# Patient Record
Sex: FEMALE | Race: BLACK OR AFRICAN AMERICAN | NOT HISPANIC OR LATINO | Employment: STUDENT | URBAN - NONMETROPOLITAN AREA
[De-identification: names, ages, dates, MRNs, and addresses within clinical notes are randomized per-mention and may not be internally consistent; named-entity substitution may affect disease eponyms.]

---

## 2021-08-16 ENCOUNTER — CLINICAL SUPPORT (OUTPATIENT)
Dept: FAMILY MEDICINE | Facility: CLINIC | Age: 21
End: 2021-08-16

## 2021-08-16 DIAGNOSIS — Z11.1 SCREENING-PULMONARY TB: Primary | ICD-10-CM

## 2021-08-16 PROCEDURE — 86580 TB INTRADERMAL TEST: CPT | Mod: ,,, | Performed by: FAMILY MEDICINE

## 2021-08-16 PROCEDURE — 86580 POCT TB SKIN TEST: ICD-10-PCS | Mod: ,,, | Performed by: FAMILY MEDICINE

## 2021-08-16 PROCEDURE — 95115 PR IMMUNOTHERAPY, ONE INJECTION: ICD-10-PCS | Mod: ,,, | Performed by: FAMILY MEDICINE

## 2021-08-16 PROCEDURE — 95115 IMMUNOTHERAPY ONE INJECTION: CPT | Mod: ,,, | Performed by: FAMILY MEDICINE

## 2021-08-18 LAB
TB INDURATION - 48 HR READ: 0 MM
TB INDURATION - 72 HR READ: 0 MM
TB SKIN TEST - 48 HR READ: NEGATIVE
TB SKIN TEST - 72 HR READ: NEGATIVE

## 2021-10-29 ENCOUNTER — OFFICE VISIT (OUTPATIENT)
Dept: FAMILY MEDICINE | Facility: CLINIC | Age: 21
End: 2021-10-29
Payer: COMMERCIAL

## 2021-10-29 VITALS
OXYGEN SATURATION: 100 % | DIASTOLIC BLOOD PRESSURE: 72 MMHG | RESPIRATION RATE: 18 BRPM | SYSTOLIC BLOOD PRESSURE: 128 MMHG | HEART RATE: 86 BPM | TEMPERATURE: 98 F

## 2021-10-29 DIAGNOSIS — R30.0 DYSURIA: Primary | ICD-10-CM

## 2021-10-29 LAB
BILIRUB SERPL-MCNC: NORMAL MG/DL
BLOOD URINE, POC: NORMAL
COLOR, POC UA: YELLOW
GLUCOSE UR QL STRIP: NORMAL
KETONES UR QL STRIP: NORMAL
LEUKOCYTE ESTERASE URINE, POC: NORMAL
NITRITE, POC UA: NORMAL
PH, POC UA: 5.5
PROTEIN, POC: NORMAL
SPECIFIC GRAVITY, POC UA: >=1.03
UROBILINOGEN, POC UA: 0.2

## 2021-10-29 PROCEDURE — 99203 OFFICE O/P NEW LOW 30 MIN: CPT | Mod: ,,, | Performed by: NURSE PRACTITIONER

## 2021-10-29 PROCEDURE — 81003 POCT URINALYSIS W/O SCOPE: ICD-10-PCS | Mod: QW,,, | Performed by: NURSE PRACTITIONER

## 2021-10-29 PROCEDURE — 99203 PR OFFICE/OUTPT VISIT, NEW, LEVL III, 30-44 MIN: ICD-10-PCS | Mod: ,,, | Performed by: NURSE PRACTITIONER

## 2021-10-29 PROCEDURE — 81003 URINALYSIS AUTO W/O SCOPE: CPT | Mod: QW,,, | Performed by: NURSE PRACTITIONER

## 2021-10-29 RX ORDER — NITROFURANTOIN 25; 75 MG/1; MG/1
100 CAPSULE ORAL 2 TIMES DAILY
Qty: 14 CAPSULE | Refills: 0 | Status: SHIPPED | OUTPATIENT
Start: 2021-10-29 | End: 2022-09-07 | Stop reason: CLARIF

## 2021-10-29 RX ORDER — PHENAZOPYRIDINE HYDROCHLORIDE 100 MG/1
100 TABLET, FILM COATED ORAL 3 TIMES DAILY PRN
Qty: 15 TABLET | Refills: 0 | Status: SHIPPED | OUTPATIENT
Start: 2021-10-29 | End: 2021-11-08

## 2022-04-28 ENCOUNTER — OFFICE VISIT (OUTPATIENT)
Dept: FAMILY MEDICINE | Facility: CLINIC | Age: 22
End: 2022-04-28
Payer: COMMERCIAL

## 2022-04-28 VITALS
BODY MASS INDEX: 29.44 KG/M2 | HEART RATE: 111 BPM | RESPIRATION RATE: 18 BRPM | HEIGHT: 62 IN | WEIGHT: 160 LBS | SYSTOLIC BLOOD PRESSURE: 138 MMHG | DIASTOLIC BLOOD PRESSURE: 82 MMHG | OXYGEN SATURATION: 100 % | TEMPERATURE: 99 F

## 2022-04-28 DIAGNOSIS — J02.9 ACUTE PHARYNGITIS, UNSPECIFIED ETIOLOGY: Primary | ICD-10-CM

## 2022-04-28 PROCEDURE — 96372 THER/PROPH/DIAG INJ SC/IM: CPT | Mod: ,,, | Performed by: NURSE PRACTITIONER

## 2022-04-28 PROCEDURE — 3008F PR BODY MASS INDEX (BMI) DOCUMENTED: ICD-10-PCS | Mod: CPTII,,, | Performed by: NURSE PRACTITIONER

## 2022-04-28 PROCEDURE — 3079F DIAST BP 80-89 MM HG: CPT | Mod: CPTII,,, | Performed by: NURSE PRACTITIONER

## 2022-04-28 PROCEDURE — 96372 PR INJECTION,THERAP/PROPH/DIAG2ST, IM OR SUBCUT: ICD-10-PCS | Mod: ,,, | Performed by: NURSE PRACTITIONER

## 2022-04-28 PROCEDURE — 3075F SYST BP GE 130 - 139MM HG: CPT | Mod: CPTII,,, | Performed by: NURSE PRACTITIONER

## 2022-04-28 PROCEDURE — 3079F PR MOST RECENT DIASTOLIC BLOOD PRESSURE 80-89 MM HG: ICD-10-PCS | Mod: CPTII,,, | Performed by: NURSE PRACTITIONER

## 2022-04-28 PROCEDURE — 99213 OFFICE O/P EST LOW 20 MIN: CPT | Mod: 25,,, | Performed by: NURSE PRACTITIONER

## 2022-04-28 PROCEDURE — 3075F PR MOST RECENT SYSTOLIC BLOOD PRESS GE 130-139MM HG: ICD-10-PCS | Mod: CPTII,,, | Performed by: NURSE PRACTITIONER

## 2022-04-28 PROCEDURE — 1159F MED LIST DOCD IN RCRD: CPT | Mod: CPTII,,, | Performed by: NURSE PRACTITIONER

## 2022-04-28 PROCEDURE — 1159F PR MEDICATION LIST DOCUMENTED IN MEDICAL RECORD: ICD-10-PCS | Mod: CPTII,,, | Performed by: NURSE PRACTITIONER

## 2022-04-28 PROCEDURE — 3008F BODY MASS INDEX DOCD: CPT | Mod: CPTII,,, | Performed by: NURSE PRACTITIONER

## 2022-04-28 PROCEDURE — 99213 PR OFFICE/OUTPT VISIT, EST, LEVL III, 20-29 MIN: ICD-10-PCS | Mod: 25,,, | Performed by: NURSE PRACTITIONER

## 2022-04-28 RX ORDER — AMOXICILLIN 500 MG/1
500 CAPSULE ORAL EVERY 12 HOURS
Qty: 20 CAPSULE | Refills: 0 | Status: SHIPPED | OUTPATIENT
Start: 2022-04-28 | End: 2022-05-08

## 2022-04-28 RX ORDER — CEFTRIAXONE 1 G/1
1 INJECTION, POWDER, FOR SOLUTION INTRAMUSCULAR; INTRAVENOUS
Status: COMPLETED | OUTPATIENT
Start: 2022-04-28 | End: 2022-04-28

## 2022-04-28 RX ADMIN — CEFTRIAXONE 1 G: 1 INJECTION, POWDER, FOR SOLUTION INTRAMUSCULAR; INTRAVENOUS at 02:04

## 2022-04-28 NOTE — PROGRESS NOTES
Subjective:       Patient ID: Teetee Webb is a 22 y.o. female.    Chief Complaint: Sore Throat (Sore throat since yesterday)    Sore throat x 2 days    Review of Systems   Constitutional: Negative for appetite change, fatigue and fever.   HENT: Positive for sore throat. Negative for nasal congestion and ear pain.    Eyes: Negative for pain, discharge and itching.   Respiratory: Negative for cough and shortness of breath.    Cardiovascular: Negative for chest pain and leg swelling.   Gastrointestinal: Negative for abdominal pain, change in bowel habit, nausea, vomiting and change in bowel habit.   Musculoskeletal: Negative for back pain, gait problem and neck pain.   Integumentary:  Negative for rash and wound.   Allergic/Immunologic: Negative for immunocompromised state.   Neurological: Negative for dizziness, weakness and headaches.   All other systems reviewed and are negative.        Objective:      Physical Exam  Vitals and nursing note reviewed.   Constitutional:       General: She is not in acute distress.     Appearance: Normal appearance. She is not ill-appearing, toxic-appearing or diaphoretic.   HENT:      Head: Normocephalic.      Right Ear: Tympanic membrane, ear canal and external ear normal.      Left Ear: Tympanic membrane, ear canal and external ear normal.      Nose: Congestion present. No rhinorrhea.      Mouth/Throat:      Mouth: Mucous membranes are moist.      Pharynx: Posterior oropharyngeal erythema present. No oropharyngeal exudate.   Eyes:      General: No scleral icterus.        Right eye: No discharge.         Left eye: No discharge.      Extraocular Movements: Extraocular movements intact.      Conjunctiva/sclera: Conjunctivae normal.      Pupils: Pupils are equal, round, and reactive to light.   Cardiovascular:      Rate and Rhythm: Normal rate and regular rhythm.      Pulses: Normal pulses.      Heart sounds: Normal heart sounds. No murmur heard.  Pulmonary:      Effort: Pulmonary  effort is normal. No respiratory distress.      Breath sounds: Normal breath sounds. No wheezing, rhonchi or rales.   Musculoskeletal:         General: Normal range of motion.      Cervical back: Neck supple. No tenderness.   Lymphadenopathy:      Cervical: No cervical adenopathy.   Skin:     General: Skin is warm and dry.      Capillary Refill: Capillary refill takes less than 2 seconds.      Findings: No rash.   Neurological:      Mental Status: She is alert and oriented to person, place, and time.   Psychiatric:         Mood and Affect: Mood normal.         Behavior: Behavior normal.         Thought Content: Thought content normal.         Judgment: Judgment normal.            Assessment:       1. Acute pharyngitis, unspecified etiology        Plan:   Acute pharyngitis, unspecified etiology  -     cefTRIAXone injection 1 g  -     amoxicillin (AMOXIL) 500 MG capsule; Take 1 capsule (500 mg total) by mouth every 12 (twelve) hours. for 10 days  Dispense: 20 capsule; Refill: 0

## 2022-04-28 NOTE — LETTER
April 28, 2022      [facility]  [address] [facility] [place] [address]  Phone: [phone]  Fax: [phone]       Patient: Teetee Webb   YOB: 2000  Date of Visit: 04/28/2022    To Whom It May Concern:    Chandra Webb  was at [facility] on 04/28/2022. The patient may return to work/school on 04/29/2022 with no restrictions. If you have any questions or concerns, or if I can be of further assistance, please do not hesitate to contact me.    Sincerely,    Elizabeth Barrientos RN

## 2022-06-20 ENCOUNTER — OFFICE VISIT (OUTPATIENT)
Dept: FAMILY MEDICINE | Facility: CLINIC | Age: 22
End: 2022-06-20
Payer: COMMERCIAL

## 2022-06-20 VITALS
HEART RATE: 112 BPM | WEIGHT: 161 LBS | SYSTOLIC BLOOD PRESSURE: 132 MMHG | BODY MASS INDEX: 28.53 KG/M2 | TEMPERATURE: 98 F | DIASTOLIC BLOOD PRESSURE: 90 MMHG | OXYGEN SATURATION: 99 % | HEIGHT: 63 IN

## 2022-06-20 DIAGNOSIS — Z23 ENCOUNTER FOR VACCINATION: ICD-10-CM

## 2022-06-20 DIAGNOSIS — Z11.1 ENCOUNTER FOR TUBERCULIN SKIN TEST: ICD-10-CM

## 2022-06-20 DIAGNOSIS — Z02.0 ENCOUNTER FOR SCHOOL HEALTH EXAMINATION: Primary | ICD-10-CM

## 2022-06-20 PROCEDURE — 86580 TB INTRADERMAL TEST: CPT | Mod: ,,, | Performed by: NURSE PRACTITIONER

## 2022-06-20 PROCEDURE — 86580 PR  TB INTRADERMAL TEST: ICD-10-PCS | Mod: ,,, | Performed by: NURSE PRACTITIONER

## 2022-06-20 PROCEDURE — 1159F PR MEDICATION LIST DOCUMENTED IN MEDICAL RECORD: ICD-10-PCS | Mod: CPTII,,, | Performed by: NURSE PRACTITIONER

## 2022-06-20 PROCEDURE — 90715 TDAP VACCINE GREATER THAN OR EQUAL TO 7YO IM: ICD-10-PCS | Mod: ,,, | Performed by: NURSE PRACTITIONER

## 2022-06-20 PROCEDURE — 3075F SYST BP GE 130 - 139MM HG: CPT | Mod: CPTII,,, | Performed by: NURSE PRACTITIONER

## 2022-06-20 PROCEDURE — 90471 TDAP VACCINE GREATER THAN OR EQUAL TO 7YO IM: ICD-10-PCS | Mod: ,,, | Performed by: NURSE PRACTITIONER

## 2022-06-20 PROCEDURE — 1160F RVW MEDS BY RX/DR IN RCRD: CPT | Mod: CPTII,,, | Performed by: NURSE PRACTITIONER

## 2022-06-20 PROCEDURE — 90471 IMMUNIZATION ADMIN: CPT | Mod: ,,, | Performed by: NURSE PRACTITIONER

## 2022-06-20 PROCEDURE — 3075F PR MOST RECENT SYSTOLIC BLOOD PRESS GE 130-139MM HG: ICD-10-PCS | Mod: CPTII,,, | Performed by: NURSE PRACTITIONER

## 2022-06-20 PROCEDURE — 1160F PR REVIEW ALL MEDS BY PRESCRIBER/CLIN PHARMACIST DOCUMENTED: ICD-10-PCS | Mod: CPTII,,, | Performed by: NURSE PRACTITIONER

## 2022-06-20 PROCEDURE — 90715 TDAP VACCINE 7 YRS/> IM: CPT | Mod: ,,, | Performed by: NURSE PRACTITIONER

## 2022-06-20 PROCEDURE — 3008F PR BODY MASS INDEX (BMI) DOCUMENTED: ICD-10-PCS | Mod: CPTII,,, | Performed by: NURSE PRACTITIONER

## 2022-06-20 PROCEDURE — 99213 OFFICE O/P EST LOW 20 MIN: CPT | Mod: 25,,, | Performed by: NURSE PRACTITIONER

## 2022-06-20 PROCEDURE — 3080F PR MOST RECENT DIASTOLIC BLOOD PRESSURE >= 90 MM HG: ICD-10-PCS | Mod: CPTII,,, | Performed by: NURSE PRACTITIONER

## 2022-06-20 PROCEDURE — 99213 PR OFFICE/OUTPT VISIT, EST, LEVL III, 20-29 MIN: ICD-10-PCS | Mod: 25,,, | Performed by: NURSE PRACTITIONER

## 2022-06-20 PROCEDURE — 1159F MED LIST DOCD IN RCRD: CPT | Mod: CPTII,,, | Performed by: NURSE PRACTITIONER

## 2022-06-20 PROCEDURE — 3008F BODY MASS INDEX DOCD: CPT | Mod: CPTII,,, | Performed by: NURSE PRACTITIONER

## 2022-06-20 PROCEDURE — 3080F DIAST BP >= 90 MM HG: CPT | Mod: CPTII,,, | Performed by: NURSE PRACTITIONER

## 2022-06-20 NOTE — PROGRESS NOTES
[facility] Medicine    Chief Complaint      Chief Complaint   Patient presents with    Establish Care     Pt to establish care - will need TB skin test and Tdap for school today!       History of Present Illness      Teetee Webb is a 22 y.o. female with no chronic conditions who presents today for PE and TBST and Tdap needed for school.    Past Medical History:  History reviewed. No pertinent past medical history.    Past Surgical History:   has no past surgical history on file.    Social History:  Social History     Tobacco Use    Smoking status: Never Smoker    Smokeless tobacco: Never Used   Substance Use Topics    Alcohol use: Never    Drug use: Never       I personally reviewed all past medical, surgical, and social.     Review of Systems   Constitutional: Negative for fatigue.   HENT: Negative for ear pain and sore throat.    Eyes: Negative for pain, discharge and visual disturbance.   Respiratory: Negative for cough and shortness of breath.    Cardiovascular: Negative.    Gastrointestinal: Negative for abdominal pain, constipation and diarrhea.   Genitourinary: Negative for dysuria, menstrual problem and vaginal discharge.   Musculoskeletal: Negative for gait problem.   Skin: Negative.    Neurological: Negative for dizziness and light-headedness.        Medications:  Outpatient Encounter Medications as of 6/20/2022   Medication Sig Dispense Refill    nitrofurantoin, macrocrystal-monohydrate, (MACROBID) 100 MG capsule Take 1 capsule (100 mg total) by mouth 2 (two) times daily. (Patient not taking: No sig reported) 14 capsule 0     Facility-Administered Encounter Medications as of 6/20/2022   Medication Dose Route Frequency Provider Last Rate Last Admin    [COMPLETED] tuberculin injection 5 Units  5 Units Intradermal 1 time in Clinic/HOD ZAIRA Hauser   5 Units at 06/20/22 1105       Allergies:  Review of patient's allergies indicates:  No Known Allergies    Health Maintenance:  Immunization  "History   Administered Date(s) Administered    COVID-19, MRNA, LN-S, PF (MODERNA FULL 0.5 ML DOSE) 03/16/2021, 04/16/2021    PPD Test 08/16/2021, 06/20/2022    Tdap 06/20/2022      Health Maintenance   Topic Date Due    Hepatitis C Screening  Never done    Lipid Panel  Never done    HPV Vaccines (1 - 2-dose series) Never done    Pap Smear  Never done    TETANUS VACCINE  06/20/2032        Physical Exam      Vital Signs  Temp: 97.9 °F (36.6 °C)  Temp src: Oral  Pulse: (!) 112  SpO2: 99 %  BP: (!) 132/90  BP Location: Left arm  Patient Position: Sitting  Height and Weight  Height: 5' 3" (160 cm)  Weight: 73 kg (161 lb)  BSA (Calculated - sq m): 1.8 sq meters  BMI (Calculated): 28.5  Weight in (lb) to have BMI = 25: 140.8]    Physical Exam  Vitals and nursing note reviewed.   Constitutional:       Appearance: Normal appearance. She is well-developed.   HENT:      Head: Normocephalic.      Right Ear: Hearing normal.      Left Ear: Hearing normal.      Nose: Nose normal.   Eyes:      General: Lids are normal.      Extraocular Movements: Extraocular movements intact.      Conjunctiva/sclera: Conjunctivae normal.      Pupils: Pupils are equal, round, and reactive to light.   Cardiovascular:      Rate and Rhythm: Regular rhythm. Tachycardia present.      Heart sounds: Normal heart sounds.   Pulmonary:      Effort: Pulmonary effort is normal.      Breath sounds: Normal breath sounds.   Musculoskeletal:         General: Normal range of motion.      Cervical back: Normal range of motion and neck supple.      Right lower leg: No edema.      Left lower leg: No edema.   Skin:     General: Skin is warm and dry.   Neurological:      Mental Status: She is alert and oriented to person, place, and time.      Gait: Gait is intact.   Psychiatric:         Behavior: Behavior is cooperative.          Laboratory:  CBC:      CMP:        Invalid input(s): CREATININ  LIPIDS:      TSH:      A1C:        Assessment/Plan     Teetee Webb " is a 22 y.o.female with:     1. Encounter for vaccination  - (In Office Administered) Tdap Vaccine    2. Encounter for tuberculin skin test  - tuberculin injection 5 Units    3. Encounter for school health examination       Total time spent face-to-face and non-face-to-face coordinating care for this encounter was: 20 min    Chronic conditions status updated as per HPI.  Other than changes above, cont current medications and maintain follow up with specialists.  Return to clinic as needed.    Nanette Esparza, LENIP  [facility]

## 2022-06-22 ENCOUNTER — CLINICAL SUPPORT (OUTPATIENT)
Dept: FAMILY MEDICINE | Facility: CLINIC | Age: 22
End: 2022-06-22
Payer: COMMERCIAL

## 2022-06-22 DIAGNOSIS — Z11.1 ENCOUNTER FOR TUBERCULIN SKIN TEST: Primary | ICD-10-CM

## 2022-09-07 ENCOUNTER — HOSPITAL ENCOUNTER (EMERGENCY)
Facility: HOSPITAL | Age: 22
Discharge: HOME | End: 2022-09-07
Attending: EMERGENCY MEDICINE
Payer: COMMERCIAL

## 2022-09-07 VITALS
HEART RATE: 75 BPM | BODY MASS INDEX: 29.15 KG/M2 | SYSTOLIC BLOOD PRESSURE: 152 MMHG | DIASTOLIC BLOOD PRESSURE: 95 MMHG | RESPIRATION RATE: 14 BRPM | HEIGHT: 63 IN | TEMPERATURE: 99 F | OXYGEN SATURATION: 99 % | WEIGHT: 164.5 LBS
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
## 2022-09-07 PROCEDURE — 99281 EMR DPT VST MAYX REQ PHY/QHP: CPT

## 2022-09-07 PROCEDURE — 99282 EMERGENCY DEPT VISIT SF MDM: CPT | Mod: ,,, | Performed by: EMERGENCY MEDICINE

## 2022-09-07 PROCEDURE — 99282 PR EMERGENCY DEPT VISIT,LEVEL II: ICD-10-PCS | Mod: ,,, | Performed by: EMERGENCY MEDICINE
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
## 2022-09-07 NOTE — DISCHARGE INSTRUCTIONS
Follow up with PCP as needed.  If you develop any worsening problems or complaints. Return to emergency Department.

## 2022-09-08 ENCOUNTER — TELEPHONE (OUTPATIENT)
Dept: EMERGENCY MEDICINE | Facility: HOSPITAL | Age: 22
End: 2022-09-08
Payer: COMMERCIAL

## 2022-10-13 ENCOUNTER — OFFICE VISIT (OUTPATIENT)
Dept: FAMILY MEDICINE | Facility: CLINIC | Age: 22
End: 2022-10-13
Payer: COMMERCIAL

## 2022-10-13 DIAGNOSIS — J11.1 INFLUENZA: Primary | ICD-10-CM

## 2022-10-13 DIAGNOSIS — R50.9 FEVER, LOW GRADE: ICD-10-CM

## 2022-10-13 DIAGNOSIS — R52 GENERALIZED BODY ACHES: ICD-10-CM

## 2022-10-13 PROBLEM — J02.9 ACUTE PHARYNGITIS: Status: RESOLVED | Noted: 2022-04-28 | Resolved: 2022-10-13

## 2022-10-13 PROBLEM — R30.0 DYSURIA: Status: RESOLVED | Noted: 2021-10-29 | Resolved: 2022-10-13

## 2022-10-13 LAB
CTP QC/QA: YES
FLUAV AG NPH QL: POSITIVE
FLUBV AG NPH QL: NEGATIVE
SARS-COV-2 AG RESP QL IA.RAPID: NEGATIVE

## 2022-10-13 PROCEDURE — 99213 OFFICE O/P EST LOW 20 MIN: CPT | Mod: ,,, | Performed by: NURSE PRACTITIONER

## 2022-10-13 PROCEDURE — 87428 SARSCOV & INF VIR A&B AG IA: CPT | Mod: QW,,, | Performed by: NURSE PRACTITIONER

## 2022-10-13 PROCEDURE — 87428 POCT SARS-COV2 (COVID) WITH FLU ANTIGEN: ICD-10-PCS | Mod: QW,,, | Performed by: NURSE PRACTITIONER

## 2022-10-13 PROCEDURE — 1160F RVW MEDS BY RX/DR IN RCRD: CPT | Mod: CPTII,,, | Performed by: NURSE PRACTITIONER

## 2022-10-13 PROCEDURE — 1159F PR MEDICATION LIST DOCUMENTED IN MEDICAL RECORD: ICD-10-PCS | Mod: CPTII,,, | Performed by: NURSE PRACTITIONER

## 2022-10-13 PROCEDURE — 99213 PR OFFICE/OUTPT VISIT, EST, LEVL III, 20-29 MIN: ICD-10-PCS | Mod: ,,, | Performed by: NURSE PRACTITIONER

## 2022-10-13 PROCEDURE — 1159F MED LIST DOCD IN RCRD: CPT | Mod: CPTII,,, | Performed by: NURSE PRACTITIONER

## 2022-10-13 PROCEDURE — 1160F PR REVIEW ALL MEDS BY PRESCRIBER/CLIN PHARMACIST DOCUMENTED: ICD-10-PCS | Mod: CPTII,,, | Performed by: NURSE PRACTITIONER

## 2022-10-13 RX ORDER — OSELTAMIVIR PHOSPHATE 75 MG/1
75 CAPSULE ORAL 2 TIMES DAILY
Qty: 10 CAPSULE | Refills: 0 | Status: SHIPPED | OUTPATIENT
Start: 2022-10-13 | End: 2022-10-18

## 2022-10-13 NOTE — LETTER
October 13, 2022      [facility]  [address] [facility] [address]  Phone: [phone]  Fax: [phone]       Patient: Teetee Webb   YOB: 2000  Date of Visit: 10/13/2022    To Whom It May Concern:    Chandra Webb  was at [facility] on 10/13/2022. The patient may return to work/school on 10\18\2022 with no restrictions. If you have any questions or concerns, or if I can be of further assistance, please do not hesitate to contact me.    Sincerely,    LATOSHA Lee

## 2022-10-13 NOTE — PROGRESS NOTES
[facility] Medicine          Chief Complaint        Chief Complaint   Patient presents with    Fever     Current fever is 100.4    Generalized Body Aches     X1 day    Chills     X1 day               History of Present Illness           Teetee Webb is a 22 y.o. female with chronic conditions of none who presents today for body aches, chills, and fever. The s/s started about one day ago.  The pt denies chest pains, sob, n/v, and dizziness.           Past Medical History:     History reviewed. No pertinent past medical history.          Past Surgical History:      has a past surgical history that includes Wayne tooth extraction.          Social History:     Social History     Tobacco Use    Smoking status: Never    Smokeless tobacco: Never   Substance Use Topics    Alcohol use: Never    Drug use: Never             I personally reviewed all past medical, surgical, and social.           Review of Systems   Constitutional:  Positive for chills, fatigue and fever.   HENT:  Positive for congestion.    Eyes: Negative.    Respiratory: Negative.     Cardiovascular: Negative.    Gastrointestinal: Negative.    Endocrine: Negative.    Genitourinary: Negative.    Musculoskeletal:  Positive for myalgias.   Skin: Negative.    Allergic/Immunologic: Negative.    Neurological: Negative.    Psychiatric/Behavioral: Negative.              Medications:     Outpatient Encounter Medications as of 10/13/2022   Medication Sig Dispense Refill    oseltamivir (TAMIFLU) 75 MG capsule Take 1 capsule (75 mg total) by mouth 2 (two) times daily. for 5 days 10 capsule 0     No facility-administered encounter medications on file as of 10/13/2022.             Allergies:     Review of patient's allergies indicates:  No Known Allergies          Health Maintenance:     Immunization History   Administered Date(s) Administered    COVID-19, MRNA, LN-S, PF (MODERNA FULL 0.5 ML DOSE) 03/16/2021, 04/16/2021    PPD Test 08/16/2021, 06/20/2022    Tdap  06/20/2022         Health Maintenance   Topic Date Due    Hepatitis C Screening  Never done    Lipid Panel  Never done    HPV Vaccines (1 - 2-dose series) Never done    Pap Smear  Never done    TETANUS VACCINE  06/20/2032              Physical Exam            ]          Physical Exam  Constitutional:       Appearance: Normal appearance.   HENT:      Head: Normocephalic.      Right Ear: External ear normal.      Left Ear: External ear normal.      Nose: Nose normal.      Mouth/Throat:      Pharynx: Oropharynx is clear.   Eyes:      Conjunctiva/sclera: Conjunctivae normal.   Cardiovascular:      Rate and Rhythm: Normal rate.   Pulmonary:      Effort: Pulmonary effort is normal.   Abdominal:      General: Abdomen is flat.      Palpations: Abdomen is soft.   Musculoskeletal:         General: No swelling or tenderness. Normal range of motion.      Cervical back: Neck supple.      Right lower leg: No edema.      Left lower leg: No edema.   Skin:     General: Skin is warm.      Coloration: Skin is not jaundiced or pale.      Findings: No bruising, erythema or rash.   Neurological:      General: No focal deficit present.      Mental Status: She is alert and oriented to person, place, and time. Mental status is at baseline.      Cranial Nerves: No cranial nerve deficit.      Sensory: No sensory deficit.      Motor: No weakness.      Gait: Gait normal.   Psychiatric:         Mood and Affect: Mood normal.         Behavior: Behavior normal.         Thought Content: Thought content normal.         Judgment: Judgment normal.              Laboratory:     CBC:            CMP:           Invalid input(s): CREATININ     LIPIDS:            TSH:            A1C:                 Assessment/Plan          Teetee Webb is a 22 y.o.female with:           1. Influenza  - oseltamivir (TAMIFLU) 75 MG capsule; Take 1 capsule (75 mg total) by mouth 2 (two) times daily. for 5 days  Dispense: 10 capsule; Refill: 0    2. Fever, low grade  - POCT  SARS-COV2 (COVID) with Flu Antigen    3. Generalized body aches             Total time spent face-to-face and non-face-to-face coordinating care for this encounter was: 25 min          Chronic conditions status updated as per HPI.  Other than changes above, cont current medications and maintain follow up with specialists.  Return to clinic PRN.          LATOSHA Lee     [facility]

## 2023-03-08 ENCOUNTER — OFFICE VISIT (OUTPATIENT)
Dept: OBSTETRICS AND GYNECOLOGY | Facility: CLINIC | Age: 23
End: 2023-03-08
Payer: COMMERCIAL

## 2023-03-08 VITALS
RESPIRATION RATE: 17 BRPM | BODY MASS INDEX: 30.12 KG/M2 | HEART RATE: 97 BPM | SYSTOLIC BLOOD PRESSURE: 142 MMHG | WEIGHT: 170 LBS | OXYGEN SATURATION: 100 % | DIASTOLIC BLOOD PRESSURE: 80 MMHG | HEIGHT: 63 IN | TEMPERATURE: 99 F

## 2023-03-08 DIAGNOSIS — Z01.419 ENCOUNTER FOR WELL WOMAN EXAM WITH ROUTINE GYNECOLOGICAL EXAM: Primary | ICD-10-CM

## 2023-03-08 PROCEDURE — 1159F PR MEDICATION LIST DOCUMENTED IN MEDICAL RECORD: ICD-10-PCS | Mod: ,,, | Performed by: STUDENT IN AN ORGANIZED HEALTH CARE EDUCATION/TRAINING PROGRAM

## 2023-03-08 PROCEDURE — 99214 OFFICE O/P EST MOD 30 MIN: CPT | Mod: PBBFAC | Performed by: STUDENT IN AN ORGANIZED HEALTH CARE EDUCATION/TRAINING PROGRAM

## 2023-03-08 PROCEDURE — 1159F MED LIST DOCD IN RCRD: CPT | Mod: ,,, | Performed by: STUDENT IN AN ORGANIZED HEALTH CARE EDUCATION/TRAINING PROGRAM

## 2023-03-08 PROCEDURE — 3077F PR MOST RECENT SYSTOLIC BLOOD PRESSURE >= 140 MM HG: ICD-10-PCS | Mod: ,,, | Performed by: STUDENT IN AN ORGANIZED HEALTH CARE EDUCATION/TRAINING PROGRAM

## 2023-03-08 PROCEDURE — 99385 PR PREVENTIVE VISIT,NEW,18-39: ICD-10-PCS | Mod: S$PBB,,, | Performed by: STUDENT IN AN ORGANIZED HEALTH CARE EDUCATION/TRAINING PROGRAM

## 2023-03-08 PROCEDURE — 99385 PREV VISIT NEW AGE 18-39: CPT | Mod: S$PBB,,, | Performed by: STUDENT IN AN ORGANIZED HEALTH CARE EDUCATION/TRAINING PROGRAM

## 2023-03-08 PROCEDURE — 3008F PR BODY MASS INDEX (BMI) DOCUMENTED: ICD-10-PCS | Mod: ,,, | Performed by: STUDENT IN AN ORGANIZED HEALTH CARE EDUCATION/TRAINING PROGRAM

## 2023-03-08 PROCEDURE — 3077F SYST BP >= 140 MM HG: CPT | Mod: ,,, | Performed by: STUDENT IN AN ORGANIZED HEALTH CARE EDUCATION/TRAINING PROGRAM

## 2023-03-08 PROCEDURE — 3079F PR MOST RECENT DIASTOLIC BLOOD PRESSURE 80-89 MM HG: ICD-10-PCS | Mod: ,,, | Performed by: STUDENT IN AN ORGANIZED HEALTH CARE EDUCATION/TRAINING PROGRAM

## 2023-03-08 PROCEDURE — 3008F BODY MASS INDEX DOCD: CPT | Mod: ,,, | Performed by: STUDENT IN AN ORGANIZED HEALTH CARE EDUCATION/TRAINING PROGRAM

## 2023-03-08 PROCEDURE — 3079F DIAST BP 80-89 MM HG: CPT | Mod: ,,, | Performed by: STUDENT IN AN ORGANIZED HEALTH CARE EDUCATION/TRAINING PROGRAM

## 2023-03-08 RX ORDER — LEVONORGESTREL 13.5 MG/1
1 INTRAUTERINE DEVICE INTRAUTERINE ONCE
COMMUNITY
End: 2024-01-25

## 2023-03-08 NOTE — PROGRESS NOTES
Gynecology History and Physical    Assessment/Plan:   Problem List Items Addressed This Visit    None  Visit Diagnoses       Encounter for well woman exam with routine gynecological exam    -  Primary              CC:   Chief Complaint   Patient presents with    Well Woman     Last pap        HPI: Teetee Webb is a 23 y.o. female who presents to get established.  States she gets annual pap smears because she has had abnormal paps in the past.      Review of Systems: The following ROS was otherwise negative, except as noted in the HPI:  constitutional, HEENT, respiratory, cardiovascular, gastrointestinal, genitourinary, skin, musculoskeletal, neurological, psych    Gynecologic History:   History of abnormal pap smears    Menstrual history: regular with Andrae IUD.        Obstetrical History:  OB History          1    Para        Term                AB   1    Living             SAB        IAB   1    Ectopic        Multiple        Live Births                     Past Medical History:   Past Medical History:   Diagnosis Date    Abnormal Pap smear of cervix        Medications:  Medication List with Changes/Refills   Current Medications    LEVONORGESTREL (ANDRAE) 14 MCG/24 HRS (3 YRS) 13.5 MG IUD    1 each by Intrauterine route once.       Allergies:  Patient has no known allergies.    Surgical History:  Past Surgical History:   Procedure Laterality Date    WISDOM TOOTH EXTRACTION         Family History:  Family History   Problem Relation Age of Onset    Stroke Paternal Grandmother     Hypertension Paternal Grandmother     Diabetes Paternal Grandmother     Hypertension Maternal Grandmother     Breast cancer Maternal Grandmother        Social History:  Social History     Substance and Sexual Activity   Alcohol Use Yes    Comment: socially     Social History     Substance and Sexual Activity   Drug Use Never     Social History     Tobacco Use   Smoking Status Never   Smokeless Tobacco Never  "      Physical Exam:  BP (!) 142/80 (BP Location: Left arm, Patient Position: Sitting)   Pulse 97   Temp 98.8 °F (37.1 °C) (Oral)   Resp 17   Ht 5' 3" (1.6 m)   Wt 77.1 kg (170 lb)   LMP 03/06/2023   SpO2 100%   BMI 30.11 kg/m²     General: Alert, well appearing, no acute distress  Head: Normocephalic, atraumatic  Lungs: Unlabored respirations  Abdomen: Soft, nontender, nondistended   Extremities: No redness or tenderness  Skin: Well perfused, normal coloration and turgor, no lesions or rashes visualized  Neuro: Alert, oriented, normal speech, no focal deficits, moves extremities appropriately  Osteopathic: No TART changes    Labs:  No visits with results within 1 Day(s) from this visit.   Latest known visit with results is:   Office Visit on 10/13/2022   Component Date Value    SARS Coronavirus 2 Antig* 10/13/2022 Negative     Rapid Influenza A Ag 10/13/2022 Positive (A)     Rapid Influenza B Ag 10/13/2022 Negative      Acceptab* 10/13/2022 Yes       Plan:    Follow-up in June for Beatriz removal, possible reinsertion and pap.  "

## 2023-06-20 ENCOUNTER — TELEPHONE (OUTPATIENT)
Dept: OBSTETRICS AND GYNECOLOGY | Facility: CLINIC | Age: 23
End: 2023-06-20
Payer: COMMERCIAL

## 2023-06-20 DIAGNOSIS — Z30.430 ENCOUNTER FOR IUD INSERTION: Primary | ICD-10-CM

## 2023-07-05 ENCOUNTER — PROCEDURE VISIT (OUTPATIENT)
Dept: OBSTETRICS AND GYNECOLOGY | Facility: CLINIC | Age: 23
End: 2023-07-05
Payer: COMMERCIAL

## 2023-07-05 VITALS
DIASTOLIC BLOOD PRESSURE: 85 MMHG | TEMPERATURE: 99 F | SYSTOLIC BLOOD PRESSURE: 130 MMHG | HEART RATE: 91 BPM | HEIGHT: 63 IN | WEIGHT: 160.19 LBS | RESPIRATION RATE: 18 BRPM | BODY MASS INDEX: 28.38 KG/M2

## 2023-07-05 DIAGNOSIS — Z30.430 ENCOUNTER FOR IUD INSERTION: Primary | ICD-10-CM

## 2023-07-05 LAB
B-HCG UR QL: NEGATIVE
CTP QC/QA: YES

## 2023-07-05 PROCEDURE — 58300 INSERTION OF IUD-TODAY: ICD-10-PCS | Mod: S$PBB,,, | Performed by: OBSTETRICS & GYNECOLOGY

## 2023-07-05 PROCEDURE — 58301 REMOVE INTRAUTERINE DEVICE: CPT | Mod: PBBFAC | Performed by: OBSTETRICS & GYNECOLOGY

## 2023-07-05 PROCEDURE — 58300 INSERT INTRAUTERINE DEVICE: CPT | Mod: PBBFAC | Performed by: OBSTETRICS & GYNECOLOGY

## 2023-07-05 PROCEDURE — 58301 REMOVAL OF INTRAUTERINE DEVICE-TODAY: ICD-10-PCS | Mod: S$PBB,,, | Performed by: OBSTETRICS & GYNECOLOGY

## 2023-07-05 PROCEDURE — 81025 URINE PREGNANCY TEST: CPT | Mod: PBBFAC | Performed by: OBSTETRICS & GYNECOLOGY

## 2023-07-05 RX ADMIN — LEVONORGESTREL 14 MCG: 13.5 INTRAUTERINE DEVICE INTRAUTERINE at 02:07

## 2023-07-05 NOTE — PROCEDURES
Removal of Intrauterine Device-Today    Date/Time: 7/5/2023 2:30 PM  Performed by: Adrian Hidalgo MD  Authorized by: Adrian Hidalgo MD

## 2023-07-06 NOTE — PROCEDURES
Insertion of IUD-Today    Date/Time: 2023 2:30 PM  Performed by: Adrian Hidalgo MD  Authorized by: Adrian Hidalgo MD     Consent:     Consent obtained:  Verbal and written    Consent given by:  Patient    Procedure risks and benefits discussed: yes      Patient questions answered: yes      Patient agrees, verbalizes understanding, and wants to proceed: yes    Procedure:     Pelvic exam performed: yes      Negative urine pregnancy test: yes      Cervix cleaned and prepped: yes      Speculum placed in vagina: yes      Tenaculum applied to cervix: no (Allis clamp applied to cervix.)      Uterus sounded: yes      Uterus sound depth (cm):  7    IUD inserted with no complications: yes      Strings trimmed: yes    14 mcg levonorgestreL 14 mcg/24 hrs (3 yrs) 13.5 mg     Post-procedure:     Patient tolerated procedure well: yes      Patient will follow up after next period: yes    Comments:      Uterus anteverted.   Patient tolerated well.  IUD removed. New IUD replaced.     Removal of IUD    Date/Time: 2023 2:30 PM  Performed by: Adrian Hidalgo MD  Authorized by: Adrian Hidalgo MD     Consent obtained:  Verbal and written  Consent given by:  Patient  Procedure risks and benefits discussed: yes    Patient questions answered: yes    Patient agrees, verbalizes understanding, and wants to proceed: yes    Implant grasped by: ring forceps  Other reason for removal:   IUD.  Removed with no complications: yes

## 2023-07-06 NOTE — PATIENT INSTRUCTIONS
Call/return if Temp > 100.4, foul smelling vaginal discharge, vaginal bleeding > 1 pad per hour x 2 consecutive hours, new onset severe pain, or any other acute changes.     May take Motrin or Aleve prn cramping.     Pelvic rest x 24 hours recommended.

## 2023-10-30 ENCOUNTER — OFFICE VISIT (OUTPATIENT)
Dept: DERMATOLOGY | Facility: CLINIC | Age: 23
End: 2023-10-30
Payer: COMMERCIAL

## 2023-10-30 VITALS — HEIGHT: 63 IN | BODY MASS INDEX: 28.39 KG/M2 | WEIGHT: 160.25 LBS

## 2023-10-30 DIAGNOSIS — L70.0 ACNE VULGARIS: Primary | ICD-10-CM

## 2023-10-30 PROCEDURE — 3008F PR BODY MASS INDEX (BMI) DOCUMENTED: ICD-10-PCS | Mod: ,,, | Performed by: DERMATOLOGY

## 2023-10-30 PROCEDURE — 1159F MED LIST DOCD IN RCRD: CPT | Mod: ,,, | Performed by: DERMATOLOGY

## 2023-10-30 PROCEDURE — 1160F RVW MEDS BY RX/DR IN RCRD: CPT | Mod: ,,, | Performed by: DERMATOLOGY

## 2023-10-30 PROCEDURE — 99204 OFFICE O/P NEW MOD 45 MIN: CPT | Mod: ,,, | Performed by: DERMATOLOGY

## 2023-10-30 PROCEDURE — 99204 PR OFFICE/OUTPT VISIT, NEW, LEVL IV, 45-59 MIN: ICD-10-PCS | Mod: ,,, | Performed by: DERMATOLOGY

## 2023-10-30 PROCEDURE — 1159F PR MEDICATION LIST DOCUMENTED IN MEDICAL RECORD: ICD-10-PCS | Mod: ,,, | Performed by: DERMATOLOGY

## 2023-10-30 PROCEDURE — 3008F BODY MASS INDEX DOCD: CPT | Mod: ,,, | Performed by: DERMATOLOGY

## 2023-10-30 PROCEDURE — 1160F PR REVIEW ALL MEDS BY PRESCRIBER/CLIN PHARMACIST DOCUMENTED: ICD-10-PCS | Mod: ,,, | Performed by: DERMATOLOGY

## 2023-10-30 RX ORDER — DOXYCYCLINE 100 MG/1
100 CAPSULE ORAL EVERY 12 HOURS
Qty: 60 CAPSULE | Refills: 2 | Status: SHIPPED | OUTPATIENT
Start: 2023-10-30 | End: 2024-01-25

## 2023-10-30 RX ORDER — CLINDAMYCIN PHOSPHATE 10 UG/ML
LOTION TOPICAL
Qty: 60 ML | Refills: 2 | Status: SHIPPED | OUTPATIENT
Start: 2023-10-30 | End: 2024-01-25

## 2023-10-30 RX ORDER — TRETINOIN 1 MG/G
CREAM TOPICAL
Qty: 20 G | Refills: 3 | Status: SHIPPED | OUTPATIENT
Start: 2023-10-30 | End: 2024-01-25

## 2023-10-30 RX ORDER — BENZOYL PEROXIDE 5 G/100G
GEL TOPICAL
Qty: 60 G | Refills: 3 | Status: SHIPPED | OUTPATIENT
Start: 2023-10-30 | End: 2024-01-25

## 2023-10-30 NOTE — PROGRESS NOTES
[facility]   Rohini Andrade MD    Patient Name: Teetee Webb  Patient YOB: 2000   Date of Service: 10/30/23    CC: Acne    HPI: Teetee Webb is a 23 y.o. female here today for acne, located on the face, shoulders, and back.  Acne has been present for many years.  Previous treatments include Tretinoin 0.1%, clindamycin lotion, and doxycyline.      Past Medical History:   Diagnosis Date    Abnormal Pap smear of cervix      Past Surgical History:   Procedure Laterality Date    WISDOM TOOTH EXTRACTION       Review of patient's allergies indicates:  No Known Allergies    Current Outpatient Medications:     benzoyl peroxide 5% 5 % gel, Patient to mix together benzoyl peroxide gel and clindamycin lotion and apply to face in the AM, Disp: 60 g, Rfl: 3    clindamycin (CLEOCIN T) 1 % lotion, Patient to mix together benzoyl peroxide gel and clindamycin lotion and apply to face in the AM, Disp: 60 mL, Rfl: 2    doxycycline (VIBRAMYCIN) 100 MG Cap, Take 1 capsule (100 mg total) by mouth every 12 (twelve) hours., Disp: 60 capsule, Rfl: 2    levonorgestreL (ANDRAE) 14 mcg/24 hrs (3 yrs) 13.5 mg IUD, 1 each by Intrauterine route once., Disp: , Rfl:     tretinoin (RETIN-A) 0.1 % cream, Apply pea-sized amount to face very other night advancing to nightly when tolerated, Disp: 20 g, Rfl: 3    Current Facility-Administered Medications:     levonorgestreL (ANDRAE) 14 mcg/24 hrs (3 yrs) 13.5 mg IUD 14 mcg, 14 mcg, Intrauterine, , Adrian Hidalgo MD, 14 mcg at 07/05/23 1430    ROS: A focused review of systems was obtained and negative.     Exam: A focused skin exam was performed. All areas examined were normal except as mentioned in the assessment and plan below.  General Appearance of the patient is well developed and well nourished.  Orientation: alert and oriented x 3.  Mood and affect: pleasant.    Assessment:   The encounter diagnosis was Acne vulgaris.    Plan:   Medications Ordered This Encounter    Medications    benzoyl peroxide 5% 5 % gel     Sig: Patient to mix together benzoyl peroxide gel and clindamycin lotion and apply to face in the AM     Dispense:  60 g     Refill:  3    clindamycin (CLEOCIN T) 1 % lotion     Sig: Patient to mix together benzoyl peroxide gel and clindamycin lotion and apply to face in the AM     Dispense:  60 mL     Refill:  2    doxycycline (VIBRAMYCIN) 100 MG Cap     Sig: Take 1 capsule (100 mg total) by mouth every 12 (twelve) hours.     Dispense:  60 capsule     Refill:  2    tretinoin (RETIN-A) 0.1 % cream     Sig: Apply pea-sized amount to face very other night advancing to nightly when tolerated     Dispense:  20 g     Refill:  3       Acne Vulgaris (L70.0)  - Cysts, inflammatory papules and pustules, scars, and comedonal papules  Status: Inadequately controlled      Plan: Counseling.  I counseled the regarding the following:  Skin care: I discussed with the patient the importance of using cleansers, moisturizers and cosmetics that are  non-comedogenic.  Expectations: The patient is aware that it may take up to 2-3 months to see a 60-80% improvement of acne.  Contact office if: Acne worsens or fails to improve despite months of treatment; patient develops new scars,  significantly more nodules or cysts.  I recommended the following over the counter treatments: Cetaphil and CeraVe      Doxycycline Counseling: Patient counseled regarding possible photosensitivity and increased risk for sunburn. Patient instructed to avoid sunlight, if possible. When exposed to sunlight, patients should wear protective clothing, sunglasses, and sunscreen. The patient was instructed to call the office immediately if the following severe adverse effects occur: hearing changes, easy bruising/bleeding, severe headache, or vision changes. The patient verbalized understanding of the proper use and possible adverse effects of doxycycline. All of the patient's questions and concerns were addressed.  Patient understands to avoid pregnancy while on therapy due to potential birth defects.    - Will restart tretinoin and benzoyl peroxide, and clindamycin lotion  - Will start doxycycline, but consider Accutane in the future       Follow up in about 2 months (around 12/30/2023) for Acne.    Rohini Andrade MD

## 2024-01-25 ENCOUNTER — HOSPITAL ENCOUNTER (OUTPATIENT)
Dept: RADIOLOGY | Facility: HOSPITAL | Age: 24
Discharge: HOME | End: 2024-01-25
Attending: OBSTETRICS & GYNECOLOGY
Payer: COMMERCIAL

## 2024-01-25 ENCOUNTER — OFFICE VISIT (OUTPATIENT)
Dept: OBSTETRICS AND GYNECOLOGY | Facility: CLINIC | Age: 24
End: 2024-01-25
Payer: COMMERCIAL

## 2024-01-25 VITALS
DIASTOLIC BLOOD PRESSURE: 85 MMHG | SYSTOLIC BLOOD PRESSURE: 130 MMHG | WEIGHT: 153 LBS | HEART RATE: 72 BPM | HEIGHT: 63 IN | BODY MASS INDEX: 27.11 KG/M2 | RESPIRATION RATE: 18 BRPM | OXYGEN SATURATION: 98 %

## 2024-01-25 DIAGNOSIS — N92.0 INTERMENSTRUAL SPOTTING DUE TO INTRAUTERINE DEVICE (IUD): ICD-10-CM

## 2024-01-25 DIAGNOSIS — Z30.431 IUD CHECK UP: ICD-10-CM

## 2024-01-25 DIAGNOSIS — Z12.4 SCREENING FOR MALIGNANT NEOPLASM OF THE CERVIX: Primary | ICD-10-CM

## 2024-01-25 DIAGNOSIS — Z30.011 ORAL CONTRACEPTION INITIAL PRESCRIPTION: ICD-10-CM

## 2024-01-25 DIAGNOSIS — T83.32XA MALPOSITIONED INTRAUTERINE DEVICE (IUD), INITIAL ENCOUNTER: ICD-10-CM

## 2024-01-25 DIAGNOSIS — Z01.411 ENCOUNTER FOR GYNECOLOGICAL EXAMINATION (GENERAL) (ROUTINE) WITH ABNORMAL FINDINGS: ICD-10-CM

## 2024-01-25 DIAGNOSIS — Z97.5 IUD CONTRACEPTION: ICD-10-CM

## 2024-01-25 DIAGNOSIS — Z30.431 IUD CHECK UP: Primary | ICD-10-CM

## 2024-01-25 DIAGNOSIS — Z97.5 INTERMENSTRUAL SPOTTING DUE TO INTRAUTERINE DEVICE (IUD): ICD-10-CM

## 2024-01-25 PROCEDURE — 1159F MED LIST DOCD IN RCRD: CPT | Mod: ,,, | Performed by: OBSTETRICS & GYNECOLOGY

## 2024-01-25 PROCEDURE — 3079F DIAST BP 80-89 MM HG: CPT | Mod: ,,, | Performed by: OBSTETRICS & GYNECOLOGY

## 2024-01-25 PROCEDURE — 99213 OFFICE O/P EST LOW 20 MIN: CPT | Mod: PBBFAC | Performed by: OBSTETRICS & GYNECOLOGY

## 2024-01-25 PROCEDURE — 76856 US EXAM PELVIC COMPLETE: CPT | Mod: 26,,, | Performed by: RADIOLOGY

## 2024-01-25 PROCEDURE — 3075F SYST BP GE 130 - 139MM HG: CPT | Mod: ,,, | Performed by: OBSTETRICS & GYNECOLOGY

## 2024-01-25 PROCEDURE — 88142 CYTOPATH C/V THIN LAYER: CPT | Mod: TC,GCY | Performed by: OBSTETRICS & GYNECOLOGY

## 2024-01-25 PROCEDURE — 3008F BODY MASS INDEX DOCD: CPT | Mod: ,,, | Performed by: OBSTETRICS & GYNECOLOGY

## 2024-01-25 PROCEDURE — 1160F RVW MEDS BY RX/DR IN RCRD: CPT | Mod: ,,, | Performed by: OBSTETRICS & GYNECOLOGY

## 2024-01-25 PROCEDURE — 99395 PREV VISIT EST AGE 18-39: CPT | Mod: S$PBB,,, | Performed by: OBSTETRICS & GYNECOLOGY

## 2024-01-25 PROCEDURE — 76856 US EXAM PELVIC COMPLETE: CPT | Mod: TC

## 2024-01-25 RX ORDER — NORETHINDRONE ACETATE AND ETHINYL ESTRADIOL 1MG-20(21)
1 KIT ORAL DAILY
Qty: 30 TABLET | Refills: 11 | Status: SHIPPED | OUTPATIENT
Start: 2024-01-25 | End: 2025-01-24

## 2024-01-25 NOTE — PROGRESS NOTES
Annual Gynecologic Exam    Assessment/Plan:  23 y.o.  presenting for her annual exam:    Problem List Items Addressed This Visit    None  Visit Diagnoses       Screening for malignant neoplasm of the cervix    -  Primary    Relevant Orders    ThinPrep Pap Test    Encounter for gynecological examination (general) (routine) with abnormal findings        Intermenstrual spotting due to intrauterine device (IUD)        IUD contraception        Malpositioned intrauterine device (IUD), initial encounter        Relevant Orders    Removal of Intrauterine Device-Today    Oral contraception initial prescription        Relevant Medications    norethindrone-ethinyl estradiol (JUNEL FE 1/20) 1 mg-20 mcg (21)/75 mg (7) per tablet          Annual exam with PAP performed.   Declines STI screening.  TVUS ordered to evaluate IUD position to see if this is a cause of her intermenstrual spotting.   If IUD is in the correct location, then will try a month of OCPs to see if it helps control the bleeding.   RTC in 1 year for annual exam and/or prn.    IUD was found to be in the AARON. Discussed replacement vs. Removal and change to another form of contraception. She desires removal of IUD and to start OCPs.     IUD removed with ring forceps. Patient tolerated well.   New Rx for OCPs provided.    Health Maintenance:    Birth control: Beatriz IUD  Pregnancy plans: None   Safe relationship: Yes  PCP: None     Screening:  Last pap smear:   History of abnormal pap smears: Yes      CC:   Chief Complaint   Patient presents with    Annual Exam       HPI:  23 y.o.  presents for her gynecologic annual exam. She is doing well today. She is using the Beatriz IUD for contraception. She is working well for her. She has menses for 5-7 days with normal flow and minimal cramping. However, since her replacement IUD in July, she has noticed that 1 week after menses she gets spotting x 3-4 days. This is interfering with her quality of life.  Denies any pelvic pain or pain with sex.   She does have a h/o abnormal PAPs at her previous OBGYN office and was told to get annual PAPs.   She declines STI screening today.    Review of Systems: The following ROS was otherwise negative, except as noted in the HPI:  constitutional, HEENT, respiratory, cardiovascular, gastrointestinal, genitourinary, skin, musculoskeletal, neurological, psych    Primary Care Physician: No, Primary Doctor    Obstetric History  OB History    Para Term  AB Living   1       1     SAB IAB Ectopic Multiple Live Births     1            # Outcome Date GA Lbr Boaz/2nd Weight Sex Delivery Anes PTL Lv   1 IAB 2017               Gynecologic History       Social History     Substance and Sexual Activity   Sexual Activity Yes    Partners: Male    Birth control/protection: I.U.D.                   Menstrual History:   LMP: 2024    Age of Menarche: 10   Menstrual Period: regular   Interval Between Menses: 1 month   Duration of Menses: 5-7 days    Menstrual Flow: normal    Bleeding between menses: Yes 3-4 days    Sexual History:    Contraception: see above   Currently is sexually active   Denies history of STIs   Denies sexual problems    Pap History:   History of abnormal pap smears: see above   Last pap: see above    Medical History:  Past Medical History:   Diagnosis Date    Abnormal Pap smear of cervix        Medications:  Medication List with Changes/Refills   New Medications    NORETHINDRONE-ETHINYL ESTRADIOL (JUNEL FE 1/20) 1 MG-20 MCG (21)/75 MG (7) PER TABLET    Take 1 tablet by mouth once daily.   Discontinued Medications    BENZOYL PEROXIDE 5% 5 % GEL    Patient to mix together benzoyl peroxide gel and clindamycin lotion and apply to face in the AM    CLINDAMYCIN (CLEOCIN T) 1 % LOTION    Patient to mix together benzoyl peroxide gel and clindamycin lotion and apply to face in the AM    DOXYCYCLINE (VIBRAMYCIN) 100 MG CAP    Take 1 capsule (100 mg total) by mouth every 12  "(twelve) hours.    LEVONORGESTREL (ANDRAE) 14 MCG/24 HRS (3 YRS) 13.5 MG IUD    1 each by Intrauterine route once.    TRETINOIN (RETIN-A) 0.1 % CREAM    Apply pea-sized amount to face very other night advancing to nightly when tolerated       Surgical History:  Past Surgical History:   Procedure Laterality Date    WISDOM TOOTH EXTRACTION         Allergies:  Review of patient's allergies indicates:  No Known Allergies    Family History:  Family History   Problem Relation Age of Onset    Stroke Paternal Grandmother     Hypertension Paternal Grandmother     Diabetes Paternal Grandmother     Hypertension Maternal Grandmother     Breast cancer Maternal Grandmother        Social History:  Social History     Socioeconomic History    Marital status: Single   Tobacco Use    Smoking status: Never    Smokeless tobacco: Never   Substance and Sexual Activity    Alcohol use: Yes     Comment: socially    Drug use: Never    Sexual activity: Yes     Partners: Male     Birth control/protection: I.U.D.       Objective:  Body mass index is 27.1 kg/m².    /85 (BP Location: Right arm, Patient Position: Sitting)   Pulse 72   Resp 18   Ht 5' 3" (1.6 m)   Wt 69.4 kg (153 lb)   SpO2 98%   BMI 27.10 kg/m²     General: Alert, well appearing, no acute distress  Head: Normocephalic, atraumatic  Breasts: Symmetric, non-tender to palpation, no skin changes, palpable axillary lymph nodes or masses noted  Lungs: Unlabored respirations. Clear to auscultation bilaterally.  Cardiovascular: Regular rate and rhythm.  Abdomen: Soft, nontender, nondistended   Pelvic:   External: normal female genitalia, no masses or lesions   Vagina: moist, pink mucosa with rugae, old blood noted small amount.  Cervix: no masses or lesions, nontender. IUD strings visualized. PAP obtained.  Uterus: approx 8 weeks, mobile, midline, nontender  Adnexa: no masses or fullness, nontender  Rectovaginal: deferred  Extremities: No redness or tenderness  Skin: Well " perfused, normal coloration and turgor, no lesions or rashes visualized  Neuro: Alert, oriented, normal speech, no focal deficits, moves extremities appropriately  Psych: Normal mood and behavior.

## 2024-01-29 LAB
GH SERPL-MCNC: NORMAL NG/ML
INSULIN SERPL-ACNC: NORMAL U[IU]/ML
LAB AP CLINICAL INFORMATION: NORMAL
LAB AP GYN INTERPRETATION: NEGATIVE
LAB AP PAP DISCLAIMER COMMENTS: NORMAL
RENIN PLAS-CCNC: NORMAL NG/ML/H

## 2024-01-31 DIAGNOSIS — B96.89 BACTERIAL VAGINOSIS: Primary | ICD-10-CM

## 2024-01-31 DIAGNOSIS — N76.0 BACTERIAL VAGINOSIS: Primary | ICD-10-CM

## 2024-01-31 RX ORDER — TINIDAZOLE 500 MG/1
1 TABLET ORAL 2 TIMES DAILY
Qty: 4 TABLET | Refills: 0 | Status: SHIPPED | OUTPATIENT
Start: 2024-01-31 | End: 2024-02-01

## 2024-02-21 ENCOUNTER — OFFICE VISIT (OUTPATIENT)
Dept: DERMATOLOGY | Facility: CLINIC | Age: 24
End: 2024-02-21
Payer: COMMERCIAL

## 2024-02-21 DIAGNOSIS — L70.0 ACNE VULGARIS: Primary | ICD-10-CM

## 2024-02-21 PROCEDURE — 1159F MED LIST DOCD IN RCRD: CPT | Mod: ,,, | Performed by: DERMATOLOGY

## 2024-02-21 PROCEDURE — 1160F RVW MEDS BY RX/DR IN RCRD: CPT | Mod: ,,, | Performed by: DERMATOLOGY

## 2024-02-21 PROCEDURE — 99213 OFFICE O/P EST LOW 20 MIN: CPT | Mod: ,,, | Performed by: DERMATOLOGY

## 2024-02-21 RX ORDER — BENZOYL PEROXIDE 5 G/100G
GEL TOPICAL
Qty: 60 G | Refills: 11 | Status: SHIPPED | OUTPATIENT
Start: 2024-02-21

## 2024-02-21 RX ORDER — TRETINOIN 1 MG/G
CREAM TOPICAL
Qty: 20 G | Refills: 11 | Status: SHIPPED | OUTPATIENT
Start: 2024-02-21

## 2024-02-21 RX ORDER — CLINDAMYCIN PHOSPHATE 10 UG/ML
LOTION TOPICAL
Qty: 60 ML | Refills: 11 | Status: SHIPPED | OUTPATIENT
Start: 2024-02-21

## 2024-02-21 NOTE — PROGRESS NOTES
[facility]   Rohini Andrade MD    Patient Name: Teetee Webb  Patient YOB: 2000   Date of Service: 2/21/24    CC: Follow-up Acne Vulgaris    HPI: Teetee Webb is a 24 y.o. female here today for follow-up of acne, last seen 10/23.  Previous treatments include doxycycline, clinda lotion, bp gel, and tretinoin.  Overall, the acne is improved.  Treatment plan was followed as directed.    Past Medical History:   Diagnosis Date    Abnormal Pap smear of cervix      Past Surgical History:   Procedure Laterality Date    WISDOM TOOTH EXTRACTION       Review of patient's allergies indicates:  No Known Allergies    Current Outpatient Medications:     benzoyl peroxide 5% 5 % gel, Patient to mix together benzoyl peroxide gel and clindamycin lotion and apply to face in the AM, Disp: 60 g, Rfl: 11    clindamycin (CLEOCIN T) 1 % lotion, Patient to mix together benzoyl peroxide gel and clindamycin lotion and apply to face in the AM, Disp: 60 mL, Rfl: 11    norethindrone-ethinyl estradiol (JUNEL FE 1/20) 1 mg-20 mcg (21)/75 mg (7) per tablet, Take 1 tablet by mouth once daily., Disp: 30 tablet, Rfl: 11    tretinoin (RETIN-A) 0.1 % cream, Apply pea-sized amount to face very other night advancing to nightly when tolerated, Disp: 20 g, Rfl: 11    ROS: A focused review of systems was obtained and negative.     Exam: A focused skin exam was performed. All areas examined were normal except as mentioned in the assessment and plan below.  General Appearance of the patient is well developed and well nourished.  Orientation: alert and oriented x 3.  Mood and affect: pleasant.    Assessment:   The encounter diagnosis was Acne vulgaris.    Plan:   Medications Ordered This Encounter   Medications    benzoyl peroxide 5% 5 % gel     Sig: Patient to mix together benzoyl peroxide gel and clindamycin lotion and apply to face in the AM     Dispense:  60 g     Refill:  11    clindamycin (CLEOCIN T) 1 % lotion     Sig: Patient  to mix together benzoyl peroxide gel and clindamycin lotion and apply to face in the AM     Dispense:  60 mL     Refill:  11    tretinoin (RETIN-A) 0.1 % cream     Sig: Apply pea-sized amount to face very other night advancing to nightly when tolerated     Dispense:  20 g     Refill:  11       Acne Vulgaris (L70.0)  - Clear  Status: Improved      Plan: Counseling.  I counseled the regarding the following:  Skin care: I discussed with the patient the importance of using cleansers, moisturizers and cosmetics that are  non-comedogenic.  Expectations: The patient is aware that it may take up to 2-3 months to see a 60-80% improvement of acne.  Contact office if: Acne worsens or fails to improve despite months of treatment; patient develops new scars,  significantly more nodules or cysts.  I recommended the following over the counter treatments: Cetaphil and CeraVe    - Will refill topicals      Follow up if symptoms worsen or fail to improve.    Rohini Andrade MD

## 2024-03-14 ENCOUNTER — TELEPHONE (OUTPATIENT)
Dept: OBSTETRICS AND GYNECOLOGY | Facility: CLINIC | Age: 24
End: 2024-03-14
Payer: COMMERCIAL

## 2024-03-14 DIAGNOSIS — R11.0 NAUSEA: Primary | ICD-10-CM

## 2024-03-14 RX ORDER — ONDANSETRON 4 MG/1
4 TABLET, ORALLY DISINTEGRATING ORAL EVERY 6 HOURS PRN
Qty: 30 TABLET | Refills: 3 | Status: SHIPPED | OUTPATIENT
Start: 2024-03-14

## 2024-03-14 NOTE — TELEPHONE ENCOUNTER
----- Message from Adrian Hidalgo MD sent at 3/14/2024  4:38 PM CDT -----  Regarding: RE: med  I'll send some Zofran to her pharmacy, but she needs to make an appointment to come in to discuss changing her birth control if it is making her nauseated.    ----- Message -----  From: Jenny Ramirez MA  Sent: 3/14/2024   4:30 PM CDT  To: Adrian Hidalgo MD  Subject: FW: med                                            ----- Message -----  From: Stacey Alves  Sent: 3/14/2024   3:13 PM CDT  To: [facility] Staff  Subject: med                                              Patient said her BC is making her nausea want to know if you prescribe her something for her nausea, please call it into [facility] @ [phone]. Please call her@ [phone]

## 2024-12-13 ENCOUNTER — OFFICE VISIT (OUTPATIENT)
Dept: FAMILY MEDICINE | Facility: CLINIC | Age: 24
End: 2024-12-13
Payer: COMMERCIAL

## 2024-12-13 VITALS
TEMPERATURE: 99 F | HEIGHT: 63 IN | DIASTOLIC BLOOD PRESSURE: 92 MMHG | HEART RATE: 81 BPM | WEIGHT: 170 LBS | SYSTOLIC BLOOD PRESSURE: 134 MMHG | OXYGEN SATURATION: 98 % | BODY MASS INDEX: 30.12 KG/M2

## 2024-12-13 DIAGNOSIS — Z11.59 NEED FOR HEPATITIS C SCREENING TEST: ICD-10-CM

## 2024-12-13 DIAGNOSIS — R21 RASH AND OTHER NONSPECIFIC SKIN ERUPTION: ICD-10-CM

## 2024-12-13 DIAGNOSIS — Z13.220 SCREENING FOR LIPOID DISORDERS: ICD-10-CM

## 2024-12-13 DIAGNOSIS — I10 ESSENTIAL HYPERTENSION, BENIGN: ICD-10-CM

## 2024-12-13 DIAGNOSIS — Z11.4 SCREENING FOR HIV (HUMAN IMMUNODEFICIENCY VIRUS): ICD-10-CM

## 2024-12-13 DIAGNOSIS — Z13.1 SCREENING FOR DIABETES MELLITUS: ICD-10-CM

## 2024-12-13 DIAGNOSIS — Z00.00 ROUTINE GENERAL MEDICAL EXAMINATION AT A HEALTH CARE FACILITY: Primary | ICD-10-CM

## 2024-12-13 LAB
ALBUMIN SERPL BCP-MCNC: 3.7 G/DL (ref 3.5–5)
ALBUMIN/GLOB SERPL: 0.9 {RATIO}
ALP SERPL-CCNC: 47 U/L (ref 40–150)
ALT SERPL W P-5'-P-CCNC: 21 U/L
ANION GAP SERPL CALCULATED.3IONS-SCNC: 13 MMOL/L (ref 7–16)
AST SERPL W P-5'-P-CCNC: 33 U/L (ref 5–34)
BASOPHILS # BLD AUTO: 0.03 K/UL (ref 0–0.2)
BASOPHILS NFR BLD AUTO: 0.4 % (ref 0–1)
BILIRUB SERPL-MCNC: 0.4 MG/DL
BUN SERPL-MCNC: 9 MG/DL (ref 7–19)
BUN/CREAT SERPL: 13 (ref 6–20)
CALCIUM SERPL-MCNC: 8.6 MG/DL (ref 8.4–10.2)
CHLORIDE SERPL-SCNC: 107 MMOL/L (ref 98–107)
CHOLEST SERPL-MCNC: 181 MG/DL
CHOLEST/HDLC SERPL: 2.5 {RATIO}
CO2 SERPL-SCNC: 22 MMOL/L (ref 22–29)
CREAT SERPL-MCNC: 0.71 MG/DL (ref 0.55–1.02)
DIFFERENTIAL METHOD BLD: ABNORMAL
EGFR (NO RACE VARIABLE) (RUSH/TITUS): 121 ML/MIN/1.73M2
EOSINOPHIL # BLD AUTO: 0.26 K/UL (ref 0–0.5)
EOSINOPHIL NFR BLD AUTO: 3.5 % (ref 1–4)
ERYTHROCYTE [DISTWIDTH] IN BLOOD BY AUTOMATED COUNT: 13 % (ref 11.5–14.5)
GLOBULIN SER-MCNC: 4.1 G/DL (ref 2–4)
GLUCOSE SERPL-MCNC: 85 MG/DL (ref 74–100)
HCT VFR BLD AUTO: 38.4 % (ref 38–47)
HCV AB SER QL: NORMAL
HDLC SERPL-MCNC: 73 MG/DL (ref 35–60)
HGB BLD-MCNC: 12.3 G/DL (ref 12–16)
HIV 1+O+2 AB SERPL QL: NORMAL
IMM GRANULOCYTES # BLD AUTO: 0.02 K/UL (ref 0–0.04)
IMM GRANULOCYTES NFR BLD: 0.3 % (ref 0–0.4)
LDLC SERPL CALC-MCNC: 102 MG/DL
LDLC/HDLC SERPL: 1.4 {RATIO}
LYMPHOCYTES # BLD AUTO: 2.51 K/UL (ref 1–4.8)
LYMPHOCYTES NFR BLD AUTO: 34 % (ref 27–41)
MCH RBC QN AUTO: 29 PG (ref 27–31)
MCHC RBC AUTO-ENTMCNC: 32 G/DL (ref 32–36)
MCV RBC AUTO: 90.6 FL (ref 80–96)
MONOCYTES # BLD AUTO: 0.48 K/UL (ref 0–0.8)
MONOCYTES NFR BLD AUTO: 6.5 % (ref 2–6)
MPC BLD CALC-MCNC: 10.9 FL (ref 9.4–12.4)
NEUTROPHILS # BLD AUTO: 4.08 K/UL (ref 1.8–7.7)
NEUTROPHILS NFR BLD AUTO: 55.3 % (ref 53–65)
NONHDLC SERPL-MCNC: 108 MG/DL
NRBC # BLD AUTO: 0 X10E3/UL
NRBC, AUTO (.00): 0 %
PLATELET # BLD AUTO: 303 K/UL (ref 150–400)
POTASSIUM SERPL-SCNC: 3.9 MMOL/L (ref 3.5–5.1)
PROT SERPL-MCNC: 7.8 G/DL (ref 6.4–8.3)
RBC # BLD AUTO: 4.24 M/UL (ref 4.2–5.4)
SODIUM SERPL-SCNC: 138 MMOL/L (ref 136–145)
TRIGL SERPL-MCNC: 30 MG/DL (ref 37–140)
TSH SERPL DL<=0.005 MIU/L-ACNC: 1.7 UIU/ML (ref 0.35–4.94)
VLDLC SERPL-MCNC: 6 MG/DL
WBC # BLD AUTO: 7.38 K/UL (ref 4.5–11)

## 2024-12-13 PROCEDURE — 86803 HEPATITIS C AB TEST: CPT | Mod: ,,, | Performed by: CLINICAL MEDICAL LABORATORY

## 2024-12-13 PROCEDURE — 80050 GENERAL HEALTH PANEL: CPT | Mod: ,,, | Performed by: CLINICAL MEDICAL LABORATORY

## 2024-12-13 PROCEDURE — 80061 LIPID PANEL: CPT | Mod: ,,, | Performed by: CLINICAL MEDICAL LABORATORY

## 2024-12-13 PROCEDURE — 87389 HIV-1 AG W/HIV-1&-2 AB AG IA: CPT | Mod: ,,, | Performed by: CLINICAL MEDICAL LABORATORY

## 2024-12-13 RX ORDER — TRIAMCINOLONE ACETONIDE 1 MG/G
CREAM TOPICAL 2 TIMES DAILY
Qty: 454 G | Refills: 0 | Status: SHIPPED | OUTPATIENT
Start: 2024-12-13

## 2024-12-13 RX ORDER — LOSARTAN POTASSIUM 25 MG/1
25 TABLET ORAL DAILY
Qty: 90 TABLET | Refills: 3 | Status: SHIPPED | OUTPATIENT
Start: 2024-12-13 | End: 2025-12-13

## 2024-12-13 NOTE — PATIENT INSTRUCTIONS
"Patient Education       Diet and Health   The Basics   Written by the doctors and editors at [facility]   Why is it important to eat a healthy diet? -- It's important to eat a healthy diet because eating the right foods can keep you healthy now and later on in life.  Which foods are especially healthy? -- Foods that are especially healthy include:  Fruits and vegetables - Eating a diet with lots of fruits and vegetables can help prevent heart disease and strokes. It might also help prevent certain types of cancers. Try to eat fruits and vegetables at each meal and also for snacks. If you don't have fresh fruits and vegetables available, you can eat frozen or canned ones instead. Doctors recommend eating at least 2 1/2 servings of vegetables and 2 servings of fruits each day.  Foods with fiber - Eating foods with a lot of fiber can help prevent heart disease and strokes. If you have type 2 diabetes, it can also help control your blood sugar. Foods that have a lot of fiber include vegetables, fruits, beans, nuts, oatmeal, and whole grain breads and cereals. You can tell how much fiber is in a food by reading the nutrition label (figure 1). Doctors recommend eating 25 to 36 grams of fiber each day.  Foods with folate (also called folic acid) - Folate is a vitamin that is important for pregnant people, since it helps prevent certain birth defects. Anyone who could get pregnant should get at least 400 micrograms of folic acid daily, whether or not they are actively trying to get pregnant. Folate is found in many breakfast cereals, oranges, orange juice, and green leafy vegetables.  Foods with calcium and vitamin D - Babies, children, and adults need calcium and vitamin D to help keep their bones strong. Adults also need calcium and vitamin D to help prevent osteoporosis. Osteoporosis is a condition that causes bones to get "thin" and break more easily than usual. Different foods and drinks have calcium and vitamin D in " "them (figure 2). People who don't get enough calcium and vitamin D in their diet might need to take a supplement.  Foods with protein - Protein helps your muscles stay strong. Healthy foods with a lot of protein include chicken, fish, eggs, beans, nuts, and soy products. Red meat also has a lot of protein, but it also contains fats, which can be unhealthy.  Some experts recommend a "Mediterranean diet." This involves eating a lot of fruits, vegetables, nuts, whole grains, and olive oil. It also includes some fish, poultry, and dairy products, but not a lot of red meat. Eating this way can help your overall health, and might even lower your risk of having a stroke.  What foods should I avoid or limit? -- To eat a healthy diet, there are some things you should avoid or limit. They include:   Fats - There are different types of fats. Some types of fats are better for your body than others.  Trans fats are especially unhealthy. They are found in margarines, many fast foods, and some store-bought baked goods. Trans fats can raise your cholesterol level and your increase your chance of getting heart disease. You should avoid eating foods with these types of fats.  The type of "polyunsaturated" fats found in fish seems to be healthy and can reduce your chance of getting heart disease. Other polyunsaturated fats might also be good for your health. When you cook, it's best to use oils with healthier fats, such as olive oil and canola oil.  Sugar - To have a healthy diet, it's important to limit or avoid sugar, sweets, and refined grains. Refined grains are found in white bread, white rice, most forms of pasta, and most packaged "snack" foods. Whole grains, such as whole-wheat bread and brown rice, have more fiber and are better for your health.  Avoiding sugar-sweetened beverages, like soda and sports drinks, can also help improve your health.  Red meat - Studies have shown that eating a lot of red meat can increase your " "risk of certain health problems, including heart disease and cancer. You should limit the amount of red meat that you eat.  Can I drink alcohol as part of a healthy diet? -- People who drink a small amount of alcohol each day might have a lower chance of getting heart disease. But drinking alcohol can lead to problems. For example, it can raise a person's chances of getting liver disease and certain types of cancers. In women, even 1 drink a day can increase the risk of getting breast cancer.  Most doctors recommend that adult women not have more than 1 drink a day and that adult men not have more than 2 drinks a day. The limits are different because most women's bodies take longer to break down alcohol.  How many calories do I need each day? -- The number of calories you need each day depends on your weight, height, age, sex, and how active you are.  Your doctor or nurse can tell you how many calories you should eat each day. If you are trying to lose weight, you should eat fewer calories each day.  What if I have questions? -- If you have questions about which foods you should or should not eat, ask your doctor or nurse. The right diet for you will depend, in part, on your health and any medical conditions you have.  All topics are updated as new evidence becomes available and our peer review process is complete.  This topic retrieved from [employer] on: Sep 21, 2021.  Topic 24663 Version 20.0  Release: 29.4.2 - C29.263  © 2021 UpToDate, Inc. and/or its affiliates. All rights reserved.  figure 1: Nutrition label - fiber     This is an example of a nutrition label. To figure out how much fiber is in a food, look for the line that says "Dietary Fiber." It's also important to look at the serving size. This food has 7 grams of fiber in each serving, and each serving is 1 cup.  Graphic 49393 Version 7.0    figure 2: Foods and drinks with calcium and vitamin D     Foods rich in calcium include ice cream, soy milk, breads, " "kale, broccoli, milk, cheese, cottage cheese, almonds, yogurt, ready-to-eat cereals, beans, and tofu. Foods rich in vitamin D include milk, fortified plant-based "milks" (soy, almond), canned tuna fish, cod liver oil, yogurt, ready-to-eat-cereals, cooked salmon, canned sardines, mackerel, and eggs. Some of these foods are rich in both.  Graphic 59515 Version 4.0    Consumer Information Use and Disclaimer   This information is not specific medical advice and does not replace information you receive from your health care provider. This is only a brief summary of general information. It does NOT include all information about conditions, illnesses, injuries, tests, procedures, treatments, therapies, discharge instructions or life-style choices that may apply to you. You must talk with your health care provider for complete information about your health and treatment options. This information should not be used to decide whether or not to accept your health care provider's advice, instructions or recommendations. Only your health care provider has the knowledge and training to provide advice that is right for you. The use of this information is governed by the [employer] End User License Agreement, available at [email or web address].The use of [employer] content is governed by the [employer] Terms of Use. ©2021 UpToDate, Inc. All rights reserved.  Copyright   © 2021 UpToDate, Inc. and/or its affiliates. All rights reserved.    "

## 2024-12-13 NOTE — PROGRESS NOTES
Subjective     Patient ID: Teetee Webb is a 24 y.o. female.    Chief Complaint: Healthy You    Pt presents for a healthy you visit and to establish care.       Review of Systems   Constitutional:  Negative for activity change, appetite change, chills, fatigue and fever.   HENT:  Negative for nasal congestion, ear discharge, nosebleeds, postnasal drip, rhinorrhea, sinus pressure/congestion, sneezing, sore throat and tinnitus.    Eyes:  Negative for pain, discharge, redness and itching.   Respiratory:  Negative for cough, choking, chest tightness, shortness of breath and wheezing.    Cardiovascular:  Negative for chest pain.   Gastrointestinal:  Negative for abdominal distention, abdominal pain, blood in stool, change in bowel habit, constipation, diarrhea, nausea and vomiting.   Genitourinary:  Negative for decreased urine volume, dysuria, flank pain, frequency, menstrual irregularity and menstrual problem.   Musculoskeletal:  Negative for back pain and gait problem.   Integumentary:  Negative for wound, breast mass and breast discharge.   Allergic/Immunologic: Negative for immunocompromised state.   Neurological:  Negative for dizziness, light-headedness and headaches.   Psychiatric/Behavioral:  Negative for agitation, behavioral problems and hallucinations.    Breast: Negative for mass         Objective     Physical Exam  Vitals and nursing note reviewed.   Constitutional:       Appearance: Normal appearance.   Cardiovascular:      Rate and Rhythm: Normal rate and regular rhythm.      Heart sounds: Normal heart sounds.   Pulmonary:      Effort: Pulmonary effort is normal.      Breath sounds: Normal breath sounds.   Musculoskeletal:         General: Normal range of motion.   Neurological:      Mental Status: She is alert and oriented to person, place, and time.   Psychiatric:         Mood and Affect: Mood normal.         Behavior: Behavior normal.            Assessment and Plan     1. Routine general medical  examination at a health care facility    2. Screening for diabetes mellitus    3. Screening for lipoid disorders  -     Lipid Panel; Future; Expected date: 12/13/2024    4. BMI 30.0-30.9,adult    5. Essential hypertension, benign  -     CBC Auto Differential; Future; Expected date: 12/13/2024  -     Comprehensive Metabolic Panel; Future; Expected date: 12/13/2024  -     TSH; Future; Expected date: 12/13/2024  -     losartan (COZAAR) 25 MG tablet; Take 1 tablet (25 mg total) by mouth once daily.  Dispense: 90 tablet; Refill: 3    6. Need for hepatitis C screening test  -     Hepatitis C Antibody; Future; Expected date: 12/13/2024    7. Screening for HIV (human immunodeficiency virus)  -     HIV 1/2 Ag/Ab (4th Gen); Future; Expected date: 12/13/2024    8. Rash and other nonspecific skin eruption  -     triamcinolone acetonide 0.1% (KENALOG) 0.1 % cream; Apply topically to the back twice a day  Dispense: 454 g; Refill: 0        Will call pt with lab results. Notify clinic in 2 weeks with a bp log.          Follow up in about 3 weeks (around 1/3/2025).

## 2024-12-15 ENCOUNTER — PATIENT MESSAGE (OUTPATIENT)
Dept: FAMILY MEDICINE | Facility: CLINIC | Age: 24
End: 2024-12-15
Payer: COMMERCIAL

## 2025-06-12 ENCOUNTER — PATIENT OUTREACH (OUTPATIENT)
Facility: HOSPITAL | Age: 25
End: 2025-06-12
Payer: COMMERCIAL

## 2025-08-25 ENCOUNTER — OFFICE VISIT (OUTPATIENT)
Dept: OBSTETRICS AND GYNECOLOGY | Facility: CLINIC | Age: 25
End: 2025-08-25
Payer: COMMERCIAL

## 2025-08-25 VITALS
BODY MASS INDEX: 30.48 KG/M2 | DIASTOLIC BLOOD PRESSURE: 92 MMHG | HEART RATE: 85 BPM | HEIGHT: 63 IN | SYSTOLIC BLOOD PRESSURE: 144 MMHG | WEIGHT: 172 LBS

## 2025-08-25 DIAGNOSIS — N89.8 VAGINAL DISCHARGE: ICD-10-CM

## 2025-08-25 DIAGNOSIS — Z30.9 ENCOUNTER FOR CONTRACEPTIVE MANAGEMENT, UNSPECIFIED TYPE: ICD-10-CM

## 2025-08-25 DIAGNOSIS — Z30.430 ENCOUNTER FOR IUD INSERTION: ICD-10-CM

## 2025-08-25 DIAGNOSIS — Z01.411 ENCOUNTER FOR GYNECOLOGICAL EXAMINATION (GENERAL) (ROUTINE) WITH ABNORMAL FINDINGS: Primary | ICD-10-CM

## 2025-08-25 DIAGNOSIS — Z12.4 SCREENING FOR MALIGNANT NEOPLASM OF THE CERVIX: ICD-10-CM

## 2025-08-25 LAB
B-HCG UR QL: NEGATIVE
BACTERIAL VAGINOSIS DNA (OHS): POSITIVE
CANDIDA GLABRATA/KRUSEI DNA (OHS): NOT DETECTED
CANDIDA SPECIES DNA (OHS): NOT DETECTED
CTP QC/QA: YES
TRICHOMONAS VAGINALIS DNA (OHS): NOT DETECTED

## 2025-08-25 PROCEDURE — 88142 CYTOPATH C/V THIN LAYER: CPT | Mod: TC,GCY | Performed by: OBSTETRICS & GYNECOLOGY

## 2025-08-25 PROCEDURE — 81515 NFCT DS BV&VAGINITIS DNA ALG: CPT | Mod: QW,,, | Performed by: CLINICAL MEDICAL LABORATORY

## 2025-08-25 PROCEDURE — 3008F BODY MASS INDEX DOCD: CPT | Mod: ,,, | Performed by: OBSTETRICS & GYNECOLOGY

## 2025-08-25 PROCEDURE — 99999PBSHW POCT URINE PREGNANCY: Mod: PBBFAC,,,

## 2025-08-25 PROCEDURE — 99395 PREV VISIT EST AGE 18-39: CPT | Mod: S$PBB,,, | Performed by: OBSTETRICS & GYNECOLOGY

## 2025-08-25 PROCEDURE — 1160F RVW MEDS BY RX/DR IN RCRD: CPT | Mod: ,,, | Performed by: OBSTETRICS & GYNECOLOGY

## 2025-08-25 PROCEDURE — 1159F MED LIST DOCD IN RCRD: CPT | Mod: ,,, | Performed by: OBSTETRICS & GYNECOLOGY

## 2025-08-25 PROCEDURE — 81025 URINE PREGNANCY TEST: CPT | Mod: PBBFAC | Performed by: OBSTETRICS & GYNECOLOGY

## 2025-08-25 PROCEDURE — 3077F SYST BP >= 140 MM HG: CPT | Mod: ,,, | Performed by: OBSTETRICS & GYNECOLOGY

## 2025-08-25 PROCEDURE — 3080F DIAST BP >= 90 MM HG: CPT | Mod: ,,, | Performed by: OBSTETRICS & GYNECOLOGY

## 2025-08-25 PROCEDURE — 99214 OFFICE O/P EST MOD 30 MIN: CPT | Mod: PBBFAC | Performed by: OBSTETRICS & GYNECOLOGY

## 2025-08-25 PROCEDURE — 99999 PR PBB SHADOW E&M-EST. PATIENT-LVL IV: CPT | Mod: PBBFAC,,, | Performed by: OBSTETRICS & GYNECOLOGY

## 2025-08-25 RX ORDER — MISOPROSTOL 200 UG/1
400 TABLET ORAL ONCE
Qty: 2 TABLET | Refills: 0 | Status: SHIPPED | OUTPATIENT
Start: 2025-08-25 | End: 2025-08-28

## 2025-08-26 ENCOUNTER — PATIENT MESSAGE (OUTPATIENT)
Dept: OBSTETRICS AND GYNECOLOGY | Facility: CLINIC | Age: 25
End: 2025-08-26
Payer: COMMERCIAL
